# Patient Record
(demographics unavailable — no encounter records)

---

## 2025-05-16 NOTE — HISTORY OF PRESENT ILLNESS
[Patient reported PAP Smear was abnormal] : Patient reported PAP Smear was abnormal [N] : Patient denies prior pregnancies [No] : never pregnant [Normal Amount/Duration] :  normal amount and duration [Regular Cycle Intervals] : periods have been regular [Frequency: Q ___ days] : menstrual periods occur approximately every [unfilled] days [Currently Active] : currently active [Men] : men [Patient reported mammogram was normal] : Patient reported mammogram was normal [Patient reported colonoscopy was normal] : Patient reported colonoscopy was normal [FreeTextEntry1] : presents for establish care and wants to discuss abnormal pap. talk about perimenopause  [Mammogramdate] : 11/2024 [PapSmeardate] : 2025 [FreeTextEntry2] : *has vasectomy

## 2025-05-16 NOTE — PLAN
[FreeTextEntry1] : here to discuss various issues (f/u from previous gyn visit a week ago)  1. ascus hpv pos: advised colpo, will f/u did get hpv vaccine  2. perimenopause sx: cycles can be regular but lately some off: 2 short only 18 days, but then 37 and 38 apart no VMS yet insomnia though waking up at 3 , really disrupted  extensive d/w pt about this 1. already tried meditation/ sleep hygeine uses occ benedryl, I said ok melatonin gives her nightmares 2. HRT? old gyn gave her prog only explained this could make for irregular bleeding I may be inclined to do full HRT but since still periods could be a bit hard to manage bleeding sx partner had vasectomhy 3. gabapentin? 4. trazadone? explained for last 2 that a PMD (or even pyschiatrist can also offer advice for insomia problems)  hx of benign breast removed had colonoscopy did br imaging november  hx of being on pill in past but prefers to be off, partner had vasectomy has seen PMD: low iron, takes supplement  will f/u  Over 50% of face to face time of new pt visit obtaining complete hx and then counseling and coordination of care. (total with documentation,etc. 45m)

## 2025-05-20 NOTE — PROCEDURE
[Colposcopy] : Colposcopy  [Time out performed] : Pre-procedure time out performed.  Patient's name, date of birth and procedure confirmed. [Consent Obtained] : Consent obtained [Risks] : risks [Benefits] : benefits [Alternatives] : alternatives [Patient] : patient [Infection] : infection [Bleeding] : bleeding [Allergic Reaction] : allergic reaction [ASCUS] : ASCUS [HPV High Risk] : HPV high risk [No Premedication] : no premedication [Colposcopy Adequate] : colposcopy adequate [Pap Performed] : pap not performed [SCI Fully Visualized] : SCI fully visualized [ECC Performed] : ECC performed [No Abnormalities] : no abnormalities [Biopsy] : biopsy taken [Hemostasis Obtained] : Hemostasis obtained [Tolerated Well] : the patient tolerated the procedure well [de-identified] : * I also ran hpv subtyping because this not done by previous gyn (record shown of ascus hpv pos) [de-identified] : 1 [de-identified] : no lesions seen just ECC done [de-identified] : ecc [de-identified] : NORMAL appearing cervix just ECC done